# Patient Record
Sex: MALE | Race: WHITE | NOT HISPANIC OR LATINO | Employment: UNEMPLOYED | URBAN - NONMETROPOLITAN AREA
[De-identification: names, ages, dates, MRNs, and addresses within clinical notes are randomized per-mention and may not be internally consistent; named-entity substitution may affect disease eponyms.]

---

## 2021-08-17 ENCOUNTER — HOSPITAL ENCOUNTER (EMERGENCY)
Facility: HOSPITAL | Age: 21
Discharge: HOME | End: 2021-08-17
Attending: EMERGENCY MEDICINE

## 2021-08-17 VITALS
DIASTOLIC BLOOD PRESSURE: 69 MMHG | SYSTOLIC BLOOD PRESSURE: 118 MMHG | RESPIRATION RATE: 12 BRPM | BODY MASS INDEX: 24.43 KG/M2 | TEMPERATURE: 98 F | WEIGHT: 200.62 LBS | HEART RATE: 72 BPM | HEIGHT: 76 IN | OXYGEN SATURATION: 99 %

## 2021-08-17 DIAGNOSIS — T50.901A ACCIDENTAL DRUG OVERDOSE: Primary | ICD-10-CM

## 2021-08-17 PROCEDURE — 96374 THER/PROPH/DIAG INJ IV PUSH: CPT

## 2021-08-17 PROCEDURE — 96361 HYDRATE IV INFUSION ADD-ON: CPT

## 2021-08-17 PROCEDURE — 99284 EMERGENCY DEPT VISIT MOD MDM: CPT | Performed by: PHYSICIAN ASSISTANT

## 2021-08-17 PROCEDURE — 93005 ELECTROCARDIOGRAM TRACING: CPT

## 2021-08-17 PROCEDURE — 99284 EMERGENCY DEPT VISIT MOD MDM: CPT

## 2021-08-17 RX ORDER — NALOXONE HYDROCHLORIDE 1 MG/ML
1 INJECTION PARENTERAL ONCE
Status: COMPLETED | OUTPATIENT
Start: 2021-08-17 | End: 2021-08-17

## 2021-08-17 RX ORDER — NALOXONE HYDROCHLORIDE 1 MG/ML
2 INJECTION INTRAMUSCULAR; INTRAVENOUS; SUBCUTANEOUS ONCE
Status: DISCONTINUED | OUTPATIENT
Start: 2021-08-17 | End: 2021-08-17

## 2021-08-17 RX ORDER — NALOXONE HYDROCHLORIDE 0.4 MG/ML
0.2 INJECTION, SOLUTION INTRAMUSCULAR; INTRAVENOUS; SUBCUTANEOUS ONCE
Status: COMPLETED | OUTPATIENT
Start: 2021-08-17 | End: 2021-08-17

## 2021-08-17 RX ADMIN — NALOXONE HYDROCHLORIDE 0.2 MG: 0.4 INJECTION, SOLUTION INTRAMUSCULAR; INTRAVENOUS; SUBCUTANEOUS at 18:56

## 2021-08-17 RX ADMIN — SODIUM CHLORIDE 1000 ML: 0.9 INJECTION, SOLUTION INTRAVENOUS at 18:14

## 2021-08-17 NOTE — ED PROVIDER NOTES
History  Chief Complaint   Patient presents with    Overdose - Accidental     admits to heroin abuse and thinks he took fentanyl today; found at [facility] bent over a toilet vomiting; 0 8mg narcan administered pre hospital      Patient presents to the emergency department today via emergency medical services for evaluation of a suspected drug overdose  This is a 80-year-old male  At this point he is alert oriented however slightly lethargic  He answers all questions appropriately  He states he is 24years old lives in [place] with his aunt  He states that he has a chronic drug problem  He states he normally smokes his drugs  He believes that he smoked fentanyl today which is what caused him to be unresponsive  EMS gave 8 Narcan IV  Patient became responsive after Narcan administration  At this point he denies homicidal or suicidal ideations  He states that he does not wish to utilize any drug rehabilitation services at this point  Patient denies any other pain or complaints at this point  None       Past Medical History:   Diagnosis Date    Heroin abuse ([facility] [address] )        History reviewed  No pertinent surgical history  History reviewed  No pertinent family history  I have reviewed and agree with the history as documented  E-Cigarette/Vaping     E-Cigarette/Vaping Substances     Social History     Tobacco Use    Smoking status: Current Every Day Smoker     Packs/day: 1 00    Smokeless tobacco: Never Used   Substance Use Topics    Alcohol use: Yes     Comment: social drinking     Drug use: Yes     Types: Fentanyl, Heroin       Review of Systems   Constitutional: Positive for fatigue  Negative for chills and fever  HENT: Negative for ear pain and sore throat  Eyes: Negative for pain and visual disturbance  Respiratory: Negative for cough and shortness of breath  Cardiovascular: Negative for chest pain and palpitations     Gastrointestinal: Negative for abdominal pain and vomiting  Genitourinary: Negative for dysuria and hematuria  Musculoskeletal: Negative for arthralgias and back pain  Skin: Negative for color change and rash  Neurological: Negative for seizures and syncope  All other systems reviewed and are negative  Physical Exam  Physical Exam  Constitutional:       General: He is not in acute distress  Appearance: He is well-developed and normal weight  He is not ill-appearing, toxic-appearing or diaphoretic  HENT:      Right Ear: External ear normal  No swelling  Tympanic membrane is not bulging  Left Ear: External ear normal  No swelling  Tympanic membrane is not bulging  Nose: Nose normal       Mouth/Throat:      Pharynx: No oropharyngeal exudate  Eyes:      General: Lids are normal       Conjunctiva/sclera: Conjunctivae normal       Pupils: Pupils are equal, round, and reactive to light  Neck:      Thyroid: No thyromegaly  Vascular: No JVD  Trachea: No tracheal deviation  Cardiovascular:      Rate and Rhythm: Normal rate and regular rhythm  Pulses: Normal pulses  Heart sounds: Normal heart sounds  No murmur heard  No friction rub  No gallop  Pulmonary:      Effort: Pulmonary effort is normal  No respiratory distress  Breath sounds: Normal breath sounds  No stridor  No wheezing or rales  Chest:      Chest wall: No tenderness  Abdominal:      General: Bowel sounds are normal  There is no distension  Palpations: Abdomen is soft  There is no mass  Tenderness: There is no abdominal tenderness  There is no guarding or rebound  Negative signs include Taylor's sign  Hernia: No hernia is present  Musculoskeletal:         General: Normal range of motion  Cervical back: Normal range of motion and neck supple  No edema  Normal range of motion  Lymphadenopathy:      Cervical: No cervical adenopathy  Skin:     General: Skin is warm and dry  Coloration: Skin is not pale        Findings: No erythema or rash  Neurological:      General: No focal deficit present  Mental Status: He is alert and oriented to person, place, and time  GCS: GCS eye subscore is 4  GCS verbal subscore is 5  GCS motor subscore is 6  Cranial Nerves: No cranial nerve deficit  Sensory: No sensory deficit  Deep Tendon Reflexes: Reflexes are normal and symmetric     Psychiatric:         Mood and Affect: Mood normal          Speech: Speech normal          Behavior: Behavior normal          Vital Signs  ED Triage Vitals [08/17/21 1742]   Temperature Pulse Respirations Blood Pressure SpO2   98 °F (36 7 °C) 79 18 127/82 97 %      Temp Source Heart Rate Source Patient Position - Orthostatic VS BP Location FiO2 (%)   Temporal Monitor Sitting Right arm --      Pain Score       No Pain           Vitals:    08/17/21 1742 08/17/21 1830 [birth year]/2000   BP: 127/82 117/69 112/65   Pulse: 79 80 76   Patient Position - Orthostatic VS: Sitting Lying Lying         Visual Acuity      ED Medications  Medications   naloxone (FOR EMS ONLY) (NARCAN) 2 MG/2ML injection 2 mg (0 mg Does not apply Given to EMS 8/17/21 1824)   sodium chloride 0 9 % bolus 1,000 mL (0 mL Intravenous Stopped 8/17/21 1855)   naloxone [facility]) injection 0 2 mg (0 2 mg Intravenous Given 8/17/21 1856)       Diagnostic Studies  Results Reviewed     None                 No orders to display              Procedures  ECG 12 Lead Documentation Only    Date/Time: 8/17/2021 6:31 PM  Performed by: Genevieve Eagle PA-C  Authorized by: Genevieve Eagle PA-C     Indications / Diagnosis:  Lethargy  ECG reviewed by me, the ED Provider: yes    Patient location:  ED  Previous ECG:     Comparison to cardiac monitor: Yes    Interpretation:     Interpretation: normal    Rate:     ECG rate:  87    ECG rate assessment: normal    Rhythm:     Rhythm: sinus rhythm    Ectopy:     Ectopy: none    QRS:     QRS axis:  Normal    QRS intervals:  Normal  Conduction:     Conduction: normal    ST segments:     ST segments:  Normal  T waves:     T waves: normal               ED Course  ED Course as of Aug 17 2105   Tue Aug 17, 2021   1830 Blood Pressure: 127/82   1830 Temperature: 98 °F (36 7 °C)   1830 Pulse: 79   1830 Respirations: 18   1830 SpO2: 97 %   2012 Pt remains sleepy but easily arousable                HEART Risk Score      Most Recent Value   Heart Score Risk Calculator   History  0 Filed at: 08/17/2021 1832   ECG  0 Filed at: 08/17/2021 1832   Age  0 Filed at: 08/17/2021 1832   Risk Factors  0 Filed at: 08/17/2021 1832   Troponin  0 Filed at: 08/17/2021 1832   HEART Score  0 Filed at: 08/17/2021 1832                                    MDM    Disposition  Final diagnoses:   Accidental drug overdose     Time reflects when diagnosis was documented in both MDM as applicable and the Disposition within this note     Time User Action Codes Description Comment    8/17/2021  9:04 PM Marilyn BECKER Add [T50 901A] Accidental drug overdose       ED Disposition     ED Disposition Condition Date/Time Comment    Discharge Stable Tue Aug 17, 2021  9:04 PM Alanna Fine discharge to home/self care  Follow-up Information    None         Patient's Medications    No medications on file     No discharge procedures on file      PDMP Review     None          ED Provider  Electronically Signed by           Lisseth Valentino PA-C  08/17/21 2105

## 2021-08-19 LAB
ATRIAL RATE: 87 BPM
P AXIS: 76 DEGREES
PR INTERVAL: 144 MS
QRS AXIS: 84 DEGREES
QRSD INTERVAL: 90 MS
QT INTERVAL: 358 MS
QTC INTERVAL: 430 MS
T WAVE AXIS: 58 DEGREES
VENTRICULAR RATE: 87 BPM

## 2021-08-19 PROCEDURE — 93010 ELECTROCARDIOGRAM REPORT: CPT | Performed by: INTERNAL MEDICINE

## 2024-10-07 ENCOUNTER — OFFICE VISIT (OUTPATIENT)
Dept: URGENT CARE | Facility: MEDICAL CENTER | Age: 24
End: 2024-10-07
Payer: COMMERCIAL

## 2024-10-07 VITALS
TEMPERATURE: 97.6 F | HEART RATE: 92 BPM | SYSTOLIC BLOOD PRESSURE: 142 MMHG | BODY MASS INDEX: 28 KG/M2 | DIASTOLIC BLOOD PRESSURE: 82 MMHG | OXYGEN SATURATION: 97 % | RESPIRATION RATE: 20 BRPM | WEIGHT: 230 LBS

## 2024-10-07 DIAGNOSIS — R19.7 NAUSEA VOMITING AND DIARRHEA: Primary | ICD-10-CM

## 2024-10-07 DIAGNOSIS — R11.2 NAUSEA VOMITING AND DIARRHEA: Primary | ICD-10-CM

## 2024-10-07 PROCEDURE — 99213 OFFICE O/P EST LOW 20 MIN: CPT

## 2024-10-07 RX ORDER — ONDANSETRON 4 MG/1
4 TABLET, ORALLY DISINTEGRATING ORAL EVERY 6 HOURS PRN
Qty: 20 TABLET | Refills: 0 | Status: SHIPPED | OUTPATIENT
Start: 2024-10-07

## 2024-10-07 NOTE — PROGRESS NOTES
[facility]        NAME: Quintin Llamas is a 24 y.o. male  : 2000    MRN: [id number]  DATE: 2024  TIME: 6:08 PM    Assessment and Plan   Nausea vomiting and diarrhea [R11.2, R19.7]  1. Nausea vomiting and diarrhea  ondansetron (ZOFRAN-ODT) 4 mg disintegrating tablet            Patient Instructions     Take Zofran as needed for nausea and vomiting  Plenty of fluids  [place] diet   Follow up with PCP in 3-5 days.  Proceed to  ER if symptoms worsen.    If tests are performed, our office will contact you with results only if changes need to made to the care plan discussed with you at the visit. You can review your full results on [facility].    Chief Complaint     Chief Complaint   Patient presents with    Vomiting     Yesterday: vomiting from 6pm-6am pt states 30x, stomach acid yellow in color, hasn't eaten anything unusual,sweats/chills, diarrhea 6x through the night, no medication has been taken          History of Present Illness       Vomiting   This is a new problem. The current episode started yesterday. The emesis has an appearance of bile. Associated symptoms include chills and diarrhea. Pertinent negatives include no abdominal pain, chest pain, coughing or fever. He has tried nothing for the symptoms.   He denies any urinary symptoms.     Review of Systems   Review of Systems   Constitutional:  Positive for chills and diaphoresis. Negative for activity change, fatigue and fever.   Respiratory:  Negative for cough, chest tightness and shortness of breath.    Cardiovascular:  Negative for chest pain.   Gastrointestinal:  Positive for diarrhea and vomiting. Negative for abdominal distention, abdominal pain, blood in stool, nausea and rectal pain.   Genitourinary: Negative.    Musculoskeletal: Negative.    Skin: Negative.    Neurological: Negative.          Current Medications       Current Outpatient Medications:     ondansetron (ZOFRAN-ODT) 4 mg disintegrating tablet, Take 1  tablet (4 mg total) by mouth every 6 (six) hours as needed for nausea or vomiting, Disp: 20 tablet, Rfl: 0    Current Allergies     Allergies as of 10/07/2024 - Reviewed 10/07/2024   Allergen Reaction Noted    Bee venom Anaphylaxis 08/17/2021    Shellfish-derived products - food allergy Anaphylaxis 08/17/2021            The following portions of the patient's history were reviewed and updated as appropriate: allergies, current medications, past family history, past medical history, past social history, past surgical history and problem list.     Past Medical History:   Diagnosis Date    Heroin abuse (HCC)        History reviewed. No pertinent surgical history.    History reviewed. No pertinent family history.      Medications have been verified.        Objective   /82   Pulse 92   Temp 97.6 °F (36.4 °C)   Resp 20   Wt 104 kg (230 lb)   SpO2 97%   BMI 28.00 kg/m²        Physical Exam     Physical Exam  Constitutional:       Appearance: Normal appearance.   HENT:      Head: Normocephalic.      Mouth/Throat:      Mouth: Mucous membranes are moist.      Pharynx: Oropharynx is clear.   Eyes:      Extraocular Movements: Extraocular movements intact.      Pupils: Pupils are equal, round, and reactive to light.   Cardiovascular:      Rate and Rhythm: Normal rate and regular rhythm.      Pulses: Normal pulses.      Heart sounds: Normal heart sounds.   Pulmonary:      Effort: Pulmonary effort is normal.      Breath sounds: Normal breath sounds.   Abdominal:      General: Abdomen is flat. Bowel sounds are normal. There is no distension.      Palpations: Abdomen is soft.      Tenderness: There is no abdominal tenderness. There is no right CVA tenderness or left CVA tenderness.   Musculoskeletal:         General: Normal range of motion.      Cervical back: Normal range of motion and neck supple.   Lymphadenopathy:      Cervical: No cervical adenopathy.   Skin:     General: Skin is warm and dry.   Neurological:       General: No focal deficit present.      Mental Status: He is alert and oriented to person, place, and time. Mental status is at baseline.   Psychiatric:         Mood and Affect: Mood normal.         Behavior: Behavior normal.         Thought Content: Thought content normal.         Judgment: Judgment normal.

## 2024-10-07 NOTE — LETTER
October 7, 2024     Patient: Quintin Llamas   YOB: 2000   Date of Visit: 10/7/2024       To Whom It May Concern:    It is my medical opinion that Quintin Llamas may return to work on 10/8/2024 .    If you have any questions or concerns, please don't hesitate to call.         Sincerely,        Joaquín Carlos PA-C    CC: No Recipients

## 2024-10-07 NOTE — PATIENT INSTRUCTIONS
Take Zofran as needed for nausea and vomiting  Plenty of fluids  [place] diet   Follow up with PCP in 3-5 days.  Proceed to  ER if symptoms worsen.    If tests are performed, our office will contact you with results only if changes need to made to the care plan discussed with you at the visit. You can review your full results on [facility].

## 2025-01-13 ENCOUNTER — APPOINTMENT (OUTPATIENT)
Dept: URGENT CARE | Facility: MEDICAL CENTER | Age: 25
End: 2025-01-13

## 2025-06-05 ENCOUNTER — OFFICE VISIT (OUTPATIENT)
Dept: URGENT CARE | Facility: MEDICAL CENTER | Age: 25
End: 2025-06-05
Payer: COMMERCIAL

## 2025-06-05 VITALS
SYSTOLIC BLOOD PRESSURE: 121 MMHG | OXYGEN SATURATION: 99 % | HEART RATE: 85 BPM | TEMPERATURE: 98.3 F | WEIGHT: 225 LBS | DIASTOLIC BLOOD PRESSURE: 80 MMHG | BODY MASS INDEX: 27.98 KG/M2 | HEIGHT: 75 IN | RESPIRATION RATE: 18 BRPM

## 2025-06-05 DIAGNOSIS — R19.7 NAUSEA VOMITING AND DIARRHEA: Primary | ICD-10-CM

## 2025-06-05 DIAGNOSIS — R11.2 NAUSEA VOMITING AND DIARRHEA: Primary | ICD-10-CM

## 2025-06-05 PROCEDURE — G0382 LEV 3 HOSP TYPE B ED VISIT: HCPCS | Performed by: PHYSICIAN ASSISTANT

## 2025-06-05 RX ORDER — ONDANSETRON 4 MG/1
4 TABLET, ORALLY DISINTEGRATING ORAL EVERY 6 HOURS PRN
Qty: 20 TABLET | Refills: 0 | Status: SHIPPED | OUTPATIENT
Start: 2025-06-05

## 2025-06-05 NOTE — PATIENT INSTRUCTIONS
[place] diet and gradually increase diet as tolerated.  Refrain from dairy products until feeling better.  Fluids  Tylenol as needed for fever or pain.  If symptoms worsen have your self rechecked.  If symptoms fail to improve follow-up with PCP.

## 2025-06-05 NOTE — LETTER
June 5, 2025     Patient: Quintin Llamas   YOB: 2000   Date of Visit: 6/5/2025       To Whom It May Concern:    It is my medical opinion that Quintin Llamas may return to work on 06/06/2025.    If you have any questions or concerns, please don't hesitate to call.         Sincerely,        Carmen Guy PA-C    CC: No Recipients

## 2025-06-05 NOTE — PROGRESS NOTES
[facility]        NAME: Quintin Llamas is a 25 y.o. male  : 2000    MRN: [id number]  DATE: 2025  TIME: 4:50 PM    Assessment and Plan   Noninfectious gastroenteritis, unspecified type [K52.9]  1. Noninfectious gastroenteritis, unspecified type              Patient Instructions     [place] diet and gradually increase diet as tolerated.  Refrain from dairy products until feeling better.  Fluids  Tylenol as needed for fever or pain.  If symptoms worsen have your self rechecked.  If symptoms fail to improve follow-up with PCP.    Follow up with PCP in 3-5 days.  Proceed to  ER if symptoms worsen.    If tests have been performed at [facility], our office will contact you with results if changes need to be made to the care plan discussed with you at the visit.  [facility]    Chief Complaint     Chief Complaint   Patient presents with    Diarrhea    Vomiting     Vomiting x 7 over past 24 hours  Diarrhea x 10 over past 24 hours.  Needs work note for yesterday and today.         History of Present Illness       Patient states he ate a pizza last night at work and then shortly after started with nausea vomiting and diarrhea.  States he had 7 episodes of diarrhea and 10 episodes of watery diarrhea over the past 24 hours.  He is requesting a note for work for yesterday and today.  States he has not had any fluids or food so far today.        Review of Systems   Review of Systems   Constitutional:  Negative for chills and fever.   Gastrointestinal:  Positive for diarrhea, nausea and vomiting. Negative for abdominal pain.         Current Medications     Current Medications[1]    Current Allergies     Allergies as of 2025 - Reviewed 2025   Allergen Reaction Noted    Bee venom Anaphylaxis 2021    Shellfish-derived products - food allergy Anaphylaxis 2021            The following portions of the patient's history were reviewed and updated as  "appropriate: allergies, current medications, past family history, past medical history, past social history, past surgical history and problem list.     Past Medical History[2]    Past Surgical History[3]    Family History[4]      Medications have been verified.        Objective   /80   Pulse 85   Temp 98.3 °F (36.8 °C)   Resp 18   Ht 6' 3\" (1.905 m)   Wt 102 kg (225 lb)   SpO2 99%   BMI 28.12 kg/m²   No LMP for male patient.       Physical Exam     Physical Exam  Vitals and nursing note reviewed.   Constitutional:       Appearance: Normal appearance.   HENT:      Head: Normocephalic and atraumatic.     Cardiovascular:      Rate and Rhythm: Normal rate.   Pulmonary:      Effort: Pulmonary effort is normal.   Abdominal:      General: Abdomen is flat. Bowel sounds are normal.      Tenderness: There is no abdominal tenderness.     Neurological:      Mental Status: He is alert.                        [1]   Current Outpatient Medications:     ondansetron (ZOFRAN-ODT) 4 mg disintegrating tablet, Take 1 tablet (4 mg total) by mouth every 6 (six) hours as needed for nausea or vomiting (Patient not taking: Reported on 6/5/2025), Disp: 20 tablet, Rfl: 0  [2]   Past Medical History:  Diagnosis Date    Heroin abuse (HCC)    [3] No past surgical history on file.  [4]   Family History  Problem Relation Name Age of Onset    No Known Problems Mother      No Known Problems Father       "